# Patient Record
Sex: MALE | Race: WHITE | URBAN - METROPOLITAN AREA
[De-identification: names, ages, dates, MRNs, and addresses within clinical notes are randomized per-mention and may not be internally consistent; named-entity substitution may affect disease eponyms.]

---

## 2017-07-29 ENCOUNTER — APPOINTMENT (OUTPATIENT)
Dept: ULTRASOUND IMAGING | Facility: CLINIC | Age: 32
End: 2017-07-29
Attending: EMERGENCY MEDICINE
Payer: COMMERCIAL

## 2017-07-29 ENCOUNTER — HOSPITAL ENCOUNTER (EMERGENCY)
Facility: CLINIC | Age: 32
Discharge: HOME | End: 2017-07-29
Attending: EMERGENCY MEDICINE | Admitting: EMERGENCY MEDICINE
Payer: COMMERCIAL

## 2017-07-29 VITALS
HEIGHT: 69 IN | RESPIRATION RATE: 16 BRPM | OXYGEN SATURATION: 100 % | WEIGHT: 160 LBS | TEMPERATURE: 98 F | DIASTOLIC BLOOD PRESSURE: 89 MMHG | SYSTOLIC BLOOD PRESSURE: 129 MMHG | BODY MASS INDEX: 23.7 KG/M2 | HEART RATE: 60 BPM

## 2017-07-29 DIAGNOSIS — N43.3 HYDROCELE IN ADULT: ICD-10-CM

## 2017-07-29 LAB
ALBUMIN UR-MCNC: NEGATIVE MG/DL
APPEARANCE UR: ABNORMAL
BILIRUB UR QL STRIP: NEGATIVE
COLOR UR AUTO: YELLOW
GLUCOSE UR STRIP-MCNC: NEGATIVE MG/DL
HGB UR QL STRIP: NEGATIVE
KETONES UR STRIP-MCNC: 5 MG/DL
LEUKOCYTE ESTERASE UR QL STRIP: NEGATIVE
MUCOUS THREADS #/AREA URNS LPF: PRESENT /LPF
NITRATE UR QL: NEGATIVE
PH UR STRIP: 5 PH (ref 5–7)
RBC #/AREA URNS AUTO: 1 /HPF (ref 0–2)
SP GR UR STRIP: 1.03 (ref 1–1.03)
URN SPEC COLLECT METH UR: ABNORMAL
UROBILINOGEN UR STRIP-MCNC: 2 MG/DL (ref 0–2)
WBC #/AREA URNS AUTO: <1 /HPF (ref 0–2)

## 2017-07-29 PROCEDURE — 81001 URINALYSIS AUTO W/SCOPE: CPT | Performed by: EMERGENCY MEDICINE

## 2017-07-29 PROCEDURE — 99284 EMERGENCY DEPT VISIT MOD MDM: CPT | Mod: 25

## 2017-07-29 PROCEDURE — 76870 US EXAM SCROTUM: CPT

## 2017-07-29 RX ORDER — LIDOCAINE HYDROCHLORIDE 10 MG/ML
INJECTION, SOLUTION INFILTRATION; PERINEURAL
Status: DISCONTINUED
Start: 2017-07-29 | End: 2017-07-29 | Stop reason: HOSPADM

## 2017-07-29 ASSESSMENT — ENCOUNTER SYMPTOMS
FEVER: 0
ABDOMINAL PAIN: 1
DIFFICULTY URINATING: 0
DYSURIA: 0
BACK PAIN: 0

## 2017-07-29 NOTE — ED AVS SNAPSHOT
[facility] Emergency Department    [address] [place] [address]    Phone:  [phone]    Fax:  [phone]                                       Anthony Brown   MRN: [id number]    Department:  [facility] Emergency Department   Date of Visit:  7/29/2017           Patient Information     Date Of Birth          1985        Your diagnoses for this visit were:     Hydrocele in adult        You were seen by Keyonna Teixeira MD.        Discharge Instructions       Follow up with your hernia surgeon regarding your nerve pain.    IF you have any worse or different testicular symptoms seek medical care right away. If symptoms do not improve see a primary clinic on Monday.      Testicular Pain  You have had pain in one or both testicles. Based on your exam today, your pain appears to be due to a hydrocele. But your condition does not appear to be dangerous. Testicles are very sensitive. Even a small injury can cause quite a bit of pain. Other possible causes of testicular pain include kidney stones, cysts, mumps, inflammatory conditions, chronic conditions, hernia, infection, and a twisted testicle.  Certain tests may be done to rule out an underlying problem causing the pain. Nothing conclusive was found today. Most likely, the pain will go away on its own. If it doesn t, you may need more tests.    Home care  Medicine may be prescribed to help relieve pain and swelling. This may be an over-the-counter pain reliever or prescription pain medication. Take all medicine as directed.  The following are general care guidelines:    To relieve pain and swelling, apply an ice pack wrapped in a thin towel for 10 minutes at a time. Continue this on and off for 1 to 2 days.    When lying down, place a small rolled towel under your scrotum. When moving around, wear a jockstrap (athletic supporter) or supportive underwear. These will help support and protect your testicles.    If  it hurts to walk, walk as little as possible until you feel better.    Avoid strenuous activity until you feel better.    Do not have sex until you feel better.    If you have severe pain in the testicle, seek care right away. Delay may lead to permanent loss of the testicle s function.  Follow-up care  Follow up with your healthcare provider, or as advised.  When to seek medical advice  Call your healthcare provider right away if any of these occur:    Fever of 100.4 F (38 C) or higher    Worsening of the pain or severe pain    Swelling of the testicle or scrotum    A lump in the scrotum    Warm and red scrotum (signs of infection)    Nausea and vomiting    Pain or swelling in abdomen    Trouble urinating    Numbness or weakness in the leg    Shrinking of the testicle    Blood in your urine  Date Last Reviewed: 10/1/2016    [id number] [employer]. [address] [place] [address]. All rights reserved. This information is not intended as a substitute for professional medical care. Always follow your healthcare professional's instructions.          24 Hour Appointment Hotline       To make an appointment at any [facility], call [phone] ([phone]). If you don't have a family doctor or clinic, we will help you find one. [facility] are conveniently located to serve the needs of you and your family.             Review of your medicines      Notice     You have not been prescribed any medications.            Procedures and tests performed during your visit     UA reflex to Microscopic and Culture    US Testicular and Scrotum      Orders Needing Specimen Collection     None      Pending Results     No orders found from 7/27/2017 to 7/30/2017.            Pending Culture Results     No orders found from 7/27/2017 to 7/30/2017.            Pending Results Instructions     If you had any lab results that were not finalized at the time of your Discharge, you can call the ED Lab  Result RN at [phone]. You will be contacted by this team for any positive Lab results or changes in treatment. The nurses are available 7 days a week from 10A to 6:30P.  You can leave a message 24 hours per day and they will return your call.        Test Results From Your Hospital Stay        7/29/2017 11:32 AM      Component Results     Component Value Ref Range & Units Status    Color Urine Yellow  Final    Appearance Urine Slightly Cloudy  Final    Glucose Urine Negative NEG mg/dL Final    Bilirubin Urine Negative NEG Final    Ketones Urine 5 (A) NEG mg/dL Final    Specific Gravity Urine 1.028 1.003 - 1.035 Final    Blood Urine Negative NEG Final    pH Urine 5.0 5.0 - 7.0 pH Final    Protein Albumin Urine Negative NEG mg/dL Final    Urobilinogen mg/dL 2.0 0.0 - 2.0 mg/dL Final    Nitrite Urine Negative NEG Final    Leukocyte Esterase Urine Negative NEG Final    Source Midstream Urine  Final    RBC Urine 1 0 - 2 /HPF Final    WBC Urine <1 0 - 2 /HPF Final    Mucous Urine Present (A) NEG /LPF Final         7/29/2017 11:34 AM      Narrative     US TESTICULAR AND SCROTUM 7/29/2017 11:31 AM    HISTORY: Testicular pain        Impression     IMPRESSION: Normal testes. Normal blood flow to both testes. No mass  lesions. Small right hydrocele. No other findings.    COCO SALDIVAR MD                Clinical Quality Measure: Blood Pressure Screening     Your blood pressure was checked while you were in the emergency department today. The last reading we obtained was  BP: 129/89 . Please read the guidelines below about what these numbers mean and what you should do about them.  If your systolic blood pressure (the top number) is less than 120 and your diastolic blood pressure (the bottom number) is less than 80, then your blood pressure is normal. There is nothing more that you need to do about it.  If your systolic blood pressure (the top number) is 120-139 or your diastolic blood pressure (the bottom number) is 80-89,  "your blood pressure may be higher than it should be. You should have your blood pressure rechecked within a year by a primary care provider.  If your systolic blood pressure (the top number) is 140 or greater or your diastolic blood pressure (the bottom number) is 90 or greater, you may have high blood pressure. High blood pressure is treatable, but if left untreated over time it can put you at risk for heart attack, stroke, or kidney failure. You should have your blood pressure rechecked by a primary care provider within the next 4 weeks.  If your provider in the emergency department today gave you specific instructions to follow-up with your doctor or provider even sooner than that, you should follow that instruction and not wait for up to 4 weeks for your follow-up visit.        Thank you for choosing [facility]       Thank you for choosing [facility] for your care. Our goal is always to provide you with excellent care. Hearing back from our patients is one way we can continue to improve our services. Please take a few minutes to complete the written survey that you may receive in the mail after you visit with us. Thank you!        [employer] Information     [employer] lets you send messages to your doctor, view your test results, renew your prescriptions, schedule appointments and more. To sign up, go to [email or web address] . Click on \"Log in\" on the left side of the screen, which will take you to the Welcome page. Then click on \"Sign up Now\" on the right side of the page.     You will be asked to enter the access code listed below, as well as some personal information. Please follow the directions to create your username and password.     Your access code is: [email or web address]  Expires: 10/27/2017 12:46 PM     Your access code will  in 90 days. If you need help or a new code, please call your [facility] clinic or [phone].        Care EveryWhere ID     This is your Care EveryWhere ID. This could be used by other " organizations to access your [facility] medical records  [id number]        Equal Access to Services     LIANA CHIU : Mike Tuttle, margie cisneros, sneha navarrete. So [facility] [phone].    ATENCIÓN: Si habla español, tiene a gandhi disposición servicios gratuitos de asistencia lingüística. Llame al [phone].    We comply with applicable federal civil rights laws and [place] laws. We do not discriminate on the basis of race, color, national origin, age, disability sex, sexual orientation or gender identity.            After Visit Summary       This is your record. Keep this with you and show to your community pharmacist(s) and doctor(s) at your next visit.

## 2017-07-29 NOTE — ED AVS SNAPSHOT
[facility] Emergency Department    [address]    [facility] [address]    Phone:  [phone]    Fax:  [phone]                                       Anthony Brown   MRN: [id number]    Department:  [facility] Emergency Department   Date of Visit:  7/29/2017           After Visit Summary Signature Page     I have received my discharge instructions, and my questions have been answered. I have discussed any challenges I see with this plan with the nurse or doctor.    ..........................................................................................................................................  Patient/Patient Representative Signature      ..........................................................................................................................................  Patient Representative Print Name and Relationship to Patient    ..................................................               ................................................  Date                                            Time    ..........................................................................................................................................  Reviewed by Signature/Title    ...................................................              ..............................................  Date                                                            Time

## 2017-07-29 NOTE — ED PROVIDER NOTES
"  History     Chief Complaint:  Abdominal Pain    HPI   Anthony Brown is a 31 year old male with a history of hernias who presents with abdominal pain. The patient reports that he has had 3 surgeries to repair inguinal hernias with the first one 8 years ago. He reports that he has chronically had some discomfort in the incision sites and decreased sensation which he describes as an \"icy hot sensation\" in the bilateral inguinal areas.  1 month ago he started to have increased discomfort at the incision sites as well as some intermittent right testicular pain, which sometimes radiates to the. He states that the pain is intermittent over this time but occurs every day. The patient presents here today due to noticing his right testicle has become \"almost double\" the size of the other, along with the recurrent abdominal pain. He denies any trauma to his abdomen or testicles recently, he has no urinary complications, or back pain.  He did have a recent new sexual partner in the past three weeks, but his symptoms preceded this and he denies any rash, drainage, dysuria or other symptoms.    Allergies:  No known drug allergies.    Medications:    The patient is currently on no regular medications.     Past Medical History:    No significant past medical history.     Past Surgical History:    Inguinal Hernia surgery (x3)    Family History:    No pertinent family history.    Social History:  Smoking status: Current smoker  Alcohol use: No  Marital Status:  Single      Review of Systems   Constitutional: Negative for fever.   Gastrointestinal: Positive for abdominal pain.   Genitourinary: Positive for scrotal swelling and testicular pain. Negative for difficulty urinating and dysuria.   Musculoskeletal: Negative for back pain.   All other systems reviewed and are negative.      Physical Exam     Patient Vitals for the past 24 hrs:   BP Temp Temp src Pulse Resp SpO2 Height Weight   07/29/17 0945 129/89 98  F (36.7  C) " "Temporal 92 16 100 % 1.753 m (5' 9\") 72.6 kg (160 lb)       Physical Exam  Constitutional:  Well developed, Well nourished, completely comfortable appearing   HENT:  Bilateral external ears normal, Mucous membranes moist, Nose normal. Neck- Normal range of motion, Supple  Respiratory:  Normal breath sounds, No respiratory distress, No wheezing,  Cardiovascular:  Normal heart rate, Normal rhythm, No murmurs,    GI:  Bowel sounds normal, Soft, Nondistended, no rebound or guarding  :  Normal circumcised male.   Right testicle somewhat enlarged compared to left, otherwise Normal descended testicles with normal lie.   Cremasteric reflex intact bilaterally   No testicular or epididymal tenderness or mass appreciated   No inguinal hernia appreciated   No penile discharge  Musculoskeletal:  Intact distal pulses, No edema, grossly unremarkable range of motion   Integument:  Warm, Dry   Neurologic:  Alert, attentive and appropriately oriented  Psychiatric:  Mood and affect normal.         Emergency Department Course     Imaging:  US Testicular and scrotum  IMPRESSION: Normal testes. Normal blood flow to both testes. No mass  lesions. Small right hydrocele. No other findings.  Report per radiology.     Laboratory:  UA: Slightly cloudy yellow urine, Urine Ketone 5, Mucus present, otherwise WNL     Emergency Department Course:  Nursing notes and vitals reviewed.  (4689) I performed an exam of the patient as documented above.    Urine sample was obtained and sent for laboratory analysis, findings above.  The patient was sent for a Ultra Sound while in the emergency department, findings above.     Findings and plan explained to the patient. Patient discharged home with instructions regarding supportive care, medications, and reasons to return. The importance of close follow-up was reviewed.       Impression & Plan      Medical Decision Making:  Anthonynichole Brown is a 31 year old male who comes with abdominal pain and testicular " swelling. On exam there is a normal lie and no clinical evidence of torsion. Ultrasound was done and they do not see any radiologic evidence of torsion; he was determined to have a small right hydrocele. I think kidney stone is less likely since he does have palpable pain to the testicle and there is no blood in his urine. There is no evidence of hernia on exam. I think strangulated or incarcerated hernia is less likely since he is not having any vomiting or stool changes. He should not do any straining or lifting. He should return if increasing pain, swelling, fever, or other further concerns.  He does also have some chronic paresthesias in the area from previous inguinal incision sites.  This appeared to be a separate issue from the testicular symptoms, but because of these I did recommend that he follow-up with his surgeon for recheck.  We also discussed indications for immediate return.  Overall, he is comfortable with plan to discharge.    Diagnosis:    ICD-10-CM   1. Hydrocele in adult N43.3       Disposition:  Patient is discharged to home.      Sagar Paniagua  7/29/2017   [facility] EMERGENCY DEPARTMENT      I, Sagar Paniagua, am serving as a scribe on 7/29/2017 at 10:29 AM to personally document services performed by Dr. Teixeira based on my observations and the provider's statements to me.        Keyonna Teixeira MD  07/29/17 6210

## 2017-07-29 NOTE — DISCHARGE INSTRUCTIONS
Follow up with your hernia surgeon regarding your nerve pain.    IF you have any worse or different testicular symptoms seek medical care right away. If symptoms do not improve see a primary clinic on Monday.      Testicular Pain  You have had pain in one or both testicles. Based on your exam today, your pain appears to be due to a hydrocele. But your condition does not appear to be dangerous. Testicles are very sensitive. Even a small injury can cause quite a bit of pain. Other possible causes of testicular pain include kidney stones, cysts, mumps, inflammatory conditions, chronic conditions, hernia, infection, and a twisted testicle.  Certain tests may be done to rule out an underlying problem causing the pain. Nothing conclusive was found today. Most likely, the pain will go away on its own. If it doesn t, you may need more tests.    Home care  Medicine may be prescribed to help relieve pain and swelling. This may be an over-the-counter pain reliever or prescription pain medication. Take all medicine as directed.  The following are general care guidelines:    To relieve pain and swelling, apply an ice pack wrapped in a thin towel for 10 minutes at a time. Continue this on and off for 1 to 2 days.    When lying down, place a small rolled towel under your scrotum. When moving around, wear a jockstrap (athletic supporter) or supportive underwear. These will help support and protect your testicles.    If it hurts to walk, walk as little as possible until you feel better.    Avoid strenuous activity until you feel better.    Do not have sex until you feel better.    If you have severe pain in the testicle, seek care right away. Delay may lead to permanent loss of the testicle s function.  Follow-up care  Follow up with your healthcare provider, or as advised.  When to seek medical advice  Call your healthcare provider right away if any of these occur:    Fever of 100.4 F (38 C) or higher    Worsening of the pain or  severe pain    Swelling of the testicle or scrotum    A lump in the scrotum    Warm and red scrotum (signs of infection)    Nausea and vomiting    Pain or swelling in abdomen    Trouble urinating    Numbness or weakness in the leg    Shrinking of the testicle    Blood in your urine  Date Last Reviewed: 10/1/2016    0521-7129 [employer]. [address] [place] [address]. All rights reserved. This information is not intended as a substitute for professional medical care. Always follow your healthcare professional's instructions.

## 2017-07-29 NOTE — ED NOTES
Abdominal pain x 1 month, thought it was a recurrent hernia issue.  Has had testicle pain since that time.  Now swelling of the testicles, right larger than left.  ABCDs intact.